# Patient Record
Sex: MALE | Race: WHITE | Employment: PART TIME | URBAN - METROPOLITAN AREA
[De-identification: names, ages, dates, MRNs, and addresses within clinical notes are randomized per-mention and may not be internally consistent; named-entity substitution may affect disease eponyms.]

---

## 2017-03-02 ENCOUNTER — PAT TELEPHONE (OUTPATIENT)
Dept: PREADMISSION TESTING | Age: 72
End: 2017-03-02

## 2017-03-02 RX ORDER — LIDOCAINE HYDROCHLORIDE 10 MG/ML
1 INJECTION, SOLUTION EPIDURAL; INFILTRATION; INTRACAUDAL; PERINEURAL ONCE
Status: CANCELLED | OUTPATIENT
Start: 2017-03-02 | End: 2017-03-02

## 2017-03-02 RX ORDER — SODIUM CHLORIDE, SODIUM LACTATE, POTASSIUM CHLORIDE, CALCIUM CHLORIDE 600; 310; 30; 20 MG/100ML; MG/100ML; MG/100ML; MG/100ML
INJECTION, SOLUTION INTRAVENOUS CONTINUOUS
Status: CANCELLED | OUTPATIENT
Start: 2017-03-02

## 2017-03-07 ENCOUNTER — HOSPITAL ENCOUNTER (OUTPATIENT)
Dept: SURGERY | Age: 72
Discharge: HOME | End: 2017-03-07
Attending: INTERNAL MEDICINE | Admitting: INTERNAL MEDICINE

## 2017-03-07 VITALS
TEMPERATURE: 97.5 F | HEIGHT: 68 IN | OXYGEN SATURATION: 97 % | WEIGHT: 205 LBS | BODY MASS INDEX: 31.07 KG/M2 | SYSTOLIC BLOOD PRESSURE: 126 MMHG | DIASTOLIC BLOOD PRESSURE: 85 MMHG | HEART RATE: 67 BPM | RESPIRATION RATE: 16 BRPM

## 2017-03-07 RX ORDER — OMEPRAZOLE 20 MG/1
20 CAPSULE, DELAYED RELEASE ORAL DAILY
Qty: 30 CAPSULE | Refills: 3 | Status: SHIPPED | OUTPATIENT
Start: 2017-03-07

## 2017-03-07 RX ORDER — MOMETASONE FUROATE 50 UG/1
2 SPRAY, METERED NASAL DAILY
COMMUNITY

## 2017-03-07 RX ORDER — KETOROLAC TROMETHAMINE 30 MG/ML
30 INJECTION, SOLUTION INTRAMUSCULAR; INTRAVENOUS EVERY 6 HOURS PRN
COMMUNITY

## 2017-03-07 RX ORDER — SODIUM CHLORIDE, SODIUM LACTATE, POTASSIUM CHLORIDE, CALCIUM CHLORIDE 600; 310; 30; 20 MG/100ML; MG/100ML; MG/100ML; MG/100ML
INJECTION, SOLUTION INTRAVENOUS CONTINUOUS
Status: DISCONTINUED | OUTPATIENT
Start: 2017-03-07 | End: 2017-03-08 | Stop reason: HOSPADM

## 2017-03-07 RX ORDER — ATORVASTATIN CALCIUM 20 MG/1
20 TABLET, FILM COATED ORAL DAILY
COMMUNITY

## 2017-03-07 RX ORDER — LIDOCAINE HYDROCHLORIDE 10 MG/ML
1 INJECTION, SOLUTION EPIDURAL; INFILTRATION; INTRACAUDAL; PERINEURAL ONCE
Status: DISCONTINUED | OUTPATIENT
Start: 2017-03-07 | End: 2017-03-08 | Stop reason: HOSPADM

## 2017-03-07 RX ORDER — TADALAFIL 20 MG/1
20 TABLET ORAL PRN
COMMUNITY

## 2017-03-07 RX ORDER — LORATADINE 10 MG/1
10 CAPSULE, LIQUID FILLED ORAL DAILY
COMMUNITY

## 2017-03-07 RX ORDER — OMEPRAZOLE 20 MG/1
20 CAPSULE, DELAYED RELEASE ORAL DAILY
COMMUNITY

## 2017-03-07 RX ADMIN — SODIUM CHLORIDE, SODIUM LACTATE, POTASSIUM CHLORIDE, CALCIUM CHLORIDE: 600; 310; 30; 20 INJECTION, SOLUTION INTRAVENOUS at 11:24

## 2017-03-07 ASSESSMENT — PAIN - FUNCTIONAL ASSESSMENT: PAIN_FUNCTIONAL_ASSESSMENT: 0-10

## 2017-03-07 ASSESSMENT — PAIN SCALES - GENERAL
PAINLEVEL_OUTOF10: 0
PAINLEVEL_OUTOF10: 0

## 2025-05-10 ENCOUNTER — HOSPITAL ENCOUNTER (INPATIENT)
Age: 80
LOS: 1 days | Discharge: HOME | DRG: 309 | End: 2025-05-12
Attending: EMERGENCY MEDICINE | Admitting: INTERNAL MEDICINE
Payer: MEDICARE

## 2025-05-10 ENCOUNTER — APPOINTMENT (OUTPATIENT)
Age: 80
DRG: 309 | End: 2025-05-10
Payer: MEDICARE

## 2025-05-10 DIAGNOSIS — I48.91 ATRIAL FIBRILLATION WITH RVR (HCC): Primary | ICD-10-CM

## 2025-05-10 DIAGNOSIS — I48.0 PAROXYSMAL ATRIAL FIBRILLATION (HCC): ICD-10-CM

## 2025-05-10 LAB
ALBUMIN SERPL-MCNC: 4.2 G/DL (ref 3.4–5)
ALBUMIN/GLOB SERPL: 1.5 {RATIO}
ALP SERPL-CCNC: 83 U/L (ref 40–129)
ALT SERPL-CCNC: 17 U/L (ref 10–40)
ANION GAP SERPL CALCULATED.3IONS-SCNC: 12 MMOL/L (ref 3–16)
AST SERPL-CCNC: 22 U/L (ref 15–37)
BASOPHILS # BLD: 0.04 K/UL (ref 0–0.2)
BASOPHILS NFR BLD: 0 %
BILIRUB SERPL-MCNC: 0.9 MG/DL (ref 0–1)
BUN SERPL-MCNC: 11 MG/DL (ref 7–20)
CALCIUM SERPL-MCNC: 9.1 MG/DL (ref 8.3–10.6)
CHLORIDE SERPL-SCNC: 104 MMOL/L (ref 99–110)
CO2 SERPL-SCNC: 25 MMOL/L (ref 21–32)
CREAT SERPL-MCNC: 1.1 MG/DL (ref 0.8–1.3)
EOSINOPHIL # BLD: 0.2 K/UL (ref 0–0.6)
EOSINOPHILS RELATIVE PERCENT: 2 %
ERYTHROCYTE [DISTWIDTH] IN BLOOD BY AUTOMATED COUNT: 15.3 % (ref 12.4–15.4)
GFR, ESTIMATED: 70 ML/MIN/1.73M2
GLUCOSE SERPL-MCNC: 104 MG/DL (ref 70–99)
HCT VFR BLD AUTO: 50.3 % (ref 40.5–52.5)
HGB BLD-MCNC: 16.8 G/DL (ref 13.5–17.5)
IMM GRANULOCYTES # BLD AUTO: 0.03 K/UL (ref 0–0.5)
IMM GRANULOCYTES NFR BLD: 0 %
LYMPHOCYTES NFR BLD: 2.3 K/UL (ref 1–5.1)
LYMPHOCYTES RELATIVE PERCENT: 25 %
MCH RBC QN AUTO: 32 PG (ref 26–34)
MCHC RBC AUTO-ENTMCNC: 33.4 G/DL (ref 31–36)
MCV RBC AUTO: 95.8 FL (ref 80–100)
MONOCYTES NFR BLD: 1.13 K/UL (ref 0–1.3)
MONOCYTES NFR BLD: 12 %
NEUTROPHILS NFR BLD: 60 %
NEUTS SEG NFR BLD: 5.42 K/UL (ref 1.7–7.7)
PLATELET # BLD AUTO: 190 K/UL (ref 135–450)
PMV BLD AUTO: 10 FL
POTASSIUM SERPL-SCNC: 4.8 MMOL/L (ref 3.5–5.1)
PROT SERPL-MCNC: 6.9 G/DL (ref 6.4–8.2)
RBC # BLD AUTO: 5.25 M/UL (ref 4.2–5.9)
SODIUM SERPL-SCNC: 141 MMOL/L (ref 136–145)
TROPONIN I SERPL HS-MCNC: 24 NG/L (ref 0–22)
TROPONIN I SERPL HS-MCNC: 27 NG/L (ref 0–22)
WBC OTHER # BLD: 9.1 K/UL (ref 4–11)

## 2025-05-10 PROCEDURE — 96374 THER/PROPH/DIAG INJ IV PUSH: CPT

## 2025-05-10 PROCEDURE — G0378 HOSPITAL OBSERVATION PER HR: HCPCS

## 2025-05-10 PROCEDURE — 93005 ELECTROCARDIOGRAM TRACING: CPT | Performed by: EMERGENCY MEDICINE

## 2025-05-10 PROCEDURE — 6370000000 HC RX 637 (ALT 250 FOR IP): Performed by: INTERNAL MEDICINE

## 2025-05-10 PROCEDURE — 84484 ASSAY OF TROPONIN QUANT: CPT

## 2025-05-10 PROCEDURE — 96375 TX/PRO/DX INJ NEW DRUG ADDON: CPT

## 2025-05-10 PROCEDURE — 71045 X-RAY EXAM CHEST 1 VIEW: CPT

## 2025-05-10 PROCEDURE — 2500000003 HC RX 250 WO HCPCS: Performed by: INTERNAL MEDICINE

## 2025-05-10 PROCEDURE — 80053 COMPREHEN METABOLIC PANEL: CPT

## 2025-05-10 PROCEDURE — 85025 COMPLETE CBC W/AUTO DIFF WBC: CPT

## 2025-05-10 PROCEDURE — 99285 EMERGENCY DEPT VISIT HI MDM: CPT

## 2025-05-10 PROCEDURE — 2500000003 HC RX 250 WO HCPCS: Performed by: EMERGENCY MEDICINE

## 2025-05-10 RX ORDER — METOPROLOL TARTRATE 25 MG/1
25 TABLET, FILM COATED ORAL 2 TIMES DAILY
Status: DISCONTINUED | OUTPATIENT
Start: 2025-05-10 | End: 2025-05-12 | Stop reason: HOSPADM

## 2025-05-10 RX ORDER — ACETAMINOPHEN 325 MG/1
650 TABLET ORAL EVERY 6 HOURS PRN
Status: DISCONTINUED | OUTPATIENT
Start: 2025-05-10 | End: 2025-05-12 | Stop reason: HOSPADM

## 2025-05-10 RX ORDER — TAMSULOSIN HYDROCHLORIDE 0.4 MG/1
0.4 CAPSULE ORAL DAILY
Status: DISCONTINUED | OUTPATIENT
Start: 2025-05-10 | End: 2025-05-12 | Stop reason: HOSPADM

## 2025-05-10 RX ORDER — DILTIAZEM HYDROCHLORIDE 5 MG/ML
15 INJECTION INTRAVENOUS ONCE
Status: COMPLETED | OUTPATIENT
Start: 2025-05-10 | End: 2025-05-10

## 2025-05-10 RX ORDER — MAGNESIUM SULFATE IN WATER 40 MG/ML
2000 INJECTION, SOLUTION INTRAVENOUS PRN
Status: DISCONTINUED | OUTPATIENT
Start: 2025-05-10 | End: 2025-05-12 | Stop reason: HOSPADM

## 2025-05-10 RX ORDER — SERTRALINE HYDROCHLORIDE 25 MG/1
50 TABLET, FILM COATED ORAL DAILY
Status: DISCONTINUED | OUTPATIENT
Start: 2025-05-11 | End: 2025-05-12 | Stop reason: HOSPADM

## 2025-05-10 RX ORDER — SPIRONOLACTONE 25 MG/1
25 TABLET ORAL DAILY
COMMUNITY

## 2025-05-10 RX ORDER — SODIUM CHLORIDE 0.9 % (FLUSH) 0.9 %
5-40 SYRINGE (ML) INJECTION PRN
Status: DISCONTINUED | OUTPATIENT
Start: 2025-05-10 | End: 2025-05-12 | Stop reason: HOSPADM

## 2025-05-10 RX ORDER — METOPROLOL TARTRATE 25 MG/1
25 TABLET, FILM COATED ORAL 2 TIMES DAILY
Status: DISCONTINUED | OUTPATIENT
Start: 2025-05-10 | End: 2025-05-10

## 2025-05-10 RX ORDER — ACETAMINOPHEN 650 MG/1
650 SUPPOSITORY RECTAL EVERY 6 HOURS PRN
Status: DISCONTINUED | OUTPATIENT
Start: 2025-05-10 | End: 2025-05-12 | Stop reason: HOSPADM

## 2025-05-10 RX ORDER — SACUBITRIL AND VALSARTAN 24; 26 MG/1; MG/1
0.5 TABLET, FILM COATED ORAL 2 TIMES DAILY
COMMUNITY

## 2025-05-10 RX ORDER — POTASSIUM CHLORIDE 7.45 MG/ML
10 INJECTION INTRAVENOUS PRN
Status: DISCONTINUED | OUTPATIENT
Start: 2025-05-10 | End: 2025-05-12 | Stop reason: HOSPADM

## 2025-05-10 RX ORDER — TAMSULOSIN HYDROCHLORIDE 0.4 MG/1
0.4 CAPSULE ORAL DAILY
COMMUNITY

## 2025-05-10 RX ORDER — SODIUM CHLORIDE 9 MG/ML
INJECTION, SOLUTION INTRAVENOUS PRN
Status: DISCONTINUED | OUTPATIENT
Start: 2025-05-10 | End: 2025-05-12 | Stop reason: HOSPADM

## 2025-05-10 RX ORDER — ATORVASTATIN CALCIUM 40 MG/1
40 TABLET, FILM COATED ORAL DAILY
Status: DISCONTINUED | OUTPATIENT
Start: 2025-05-10 | End: 2025-05-12 | Stop reason: HOSPADM

## 2025-05-10 RX ORDER — POLYETHYLENE GLYCOL 3350 17 G/17G
17 POWDER, FOR SOLUTION ORAL DAILY PRN
Status: DISCONTINUED | OUTPATIENT
Start: 2025-05-10 | End: 2025-05-12 | Stop reason: HOSPADM

## 2025-05-10 RX ORDER — ONDANSETRON 4 MG/1
4 TABLET, ORALLY DISINTEGRATING ORAL EVERY 8 HOURS PRN
Status: DISCONTINUED | OUTPATIENT
Start: 2025-05-10 | End: 2025-05-12 | Stop reason: HOSPADM

## 2025-05-10 RX ORDER — CETIRIZINE HYDROCHLORIDE 10 MG/1
5 TABLET ORAL DAILY
Status: DISCONTINUED | OUTPATIENT
Start: 2025-05-10 | End: 2025-05-12 | Stop reason: HOSPADM

## 2025-05-10 RX ORDER — METOPROLOL TARTRATE 25 MG/1
12.5 TABLET, FILM COATED ORAL 2 TIMES DAILY
Status: DISCONTINUED | OUTPATIENT
Start: 2025-05-10 | End: 2025-05-10

## 2025-05-10 RX ORDER — OMEPRAZOLE 40 MG/1
40 CAPSULE, DELAYED RELEASE ORAL DAILY
COMMUNITY
End: 2025-05-10

## 2025-05-10 RX ORDER — SODIUM CHLORIDE 0.9 % (FLUSH) 0.9 %
5-40 SYRINGE (ML) INJECTION EVERY 12 HOURS SCHEDULED
Status: DISCONTINUED | OUTPATIENT
Start: 2025-05-10 | End: 2025-05-12 | Stop reason: HOSPADM

## 2025-05-10 RX ORDER — ONDANSETRON 2 MG/ML
4 INJECTION INTRAMUSCULAR; INTRAVENOUS EVERY 6 HOURS PRN
Status: DISCONTINUED | OUTPATIENT
Start: 2025-05-10 | End: 2025-05-12 | Stop reason: HOSPADM

## 2025-05-10 RX ORDER — SPIRONOLACTONE 25 MG/1
25 TABLET ORAL DAILY
Status: DISCONTINUED | OUTPATIENT
Start: 2025-05-10 | End: 2025-05-12 | Stop reason: HOSPADM

## 2025-05-10 RX ORDER — POTASSIUM CHLORIDE 1500 MG/1
40 TABLET, EXTENDED RELEASE ORAL PRN
Status: DISCONTINUED | OUTPATIENT
Start: 2025-05-10 | End: 2025-05-12 | Stop reason: HOSPADM

## 2025-05-10 RX ADMIN — CETIRIZINE HYDROCHLORIDE 5 MG: 10 TABLET, FILM COATED ORAL at 18:48

## 2025-05-10 RX ADMIN — SACUBITRIL AND VALSARTAN 1 TABLET: 24; 26 TABLET, FILM COATED ORAL at 21:21

## 2025-05-10 RX ADMIN — DILTIAZEM HYDROCHLORIDE 15 MG: 5 INJECTION, SOLUTION INTRAVENOUS at 15:06

## 2025-05-10 RX ADMIN — SPIRONOLACTONE 25 MG: 25 TABLET ORAL at 18:48

## 2025-05-10 RX ADMIN — Medication 10 ML: at 21:24

## 2025-05-10 RX ADMIN — METOPROLOL TARTRATE 25 MG: 25 TABLET, FILM COATED ORAL at 18:27

## 2025-05-10 RX ADMIN — ATORVASTATIN CALCIUM 40 MG: 40 TABLET, FILM COATED ORAL at 18:48

## 2025-05-10 RX ADMIN — APIXABAN 5 MG: 5 TABLET, FILM COATED ORAL at 21:22

## 2025-05-10 RX ADMIN — TAMSULOSIN HYDROCHLORIDE 0.4 MG: 0.4 CAPSULE ORAL at 18:48

## 2025-05-10 ASSESSMENT — LIFESTYLE VARIABLES
HOW MANY STANDARD DRINKS CONTAINING ALCOHOL DO YOU HAVE ON A TYPICAL DAY: PATIENT DOES NOT DRINK
HOW OFTEN DO YOU HAVE A DRINK CONTAINING ALCOHOL: NEVER

## 2025-05-10 ASSESSMENT — PAIN SCALES - GENERAL
PAINLEVEL_OUTOF10: 0

## 2025-05-10 NOTE — ED PROVIDER NOTES
[facility] EMERGENCY DEPARTMENT    CHIEF COMPLAINT  Other (Patient states his wife made him come to the ED for \"high heart rate\". Patient denies CP or heart palpitations but states he does have a history of afib. )       HISTORY OF PRESENT ILLNESS  Chance Zambrano is a 80 y.o. male with past medical history of atrial fibrillation on Eliquis, CHF, PVCs who presents to the ED with elevated heart rate.  They checked his vitals this morning and his heart rate was between 135 and 140.  They called a family member who is a nurse and they told him to come to the emergency department.  They went to [facility] and they checked his heart rate and it was still 145 so they told him to come to the emergency department.    He denies chest pain, shortness of breath, palpitations, syncope, leg swelling, fever, vomiting, diarrhea, blood in stool.     I have reviewed the following from the nursing documentation:    Past Medical History:   Diagnosis Date    DVT (deep venous thrombosis) (HCC)     Hypertension      Past Surgical History:   Procedure Laterality Date    BLADDER REMOVAL  2002    BORN WITH 2 BLADDER ,NON FUNCTIONING ONE REMOVED    BLADDER SURGERY      COLONOSCOPY  2007    NORMAL    COLONOSCOPY  10/31/12    divert.    HERNIA REPAIR      HERNIA REPAIR  2002    ANDRIY INQ.    JOINT REPLACEMENT  2010    RIGHT    KNEE SURGERY Bilateral     KNEE SURGERY  1978,2000    LEFT AND RIGHT    TONSILLECTOMY      UPPER GASTROINTESTINAL ENDOSCOPY  03/07/2017    Esophageal dilatation     Family History   Problem Relation Age of Onset    Cancer Mother         COLON ,MOUTH     Social History     Socioeconomic History    Marital status:      Spouse name: Not on file    Number of children: Not on file    Years of education: Not on file    Highest education level: Not on file   Occupational History    Not on file   Tobacco Use    Smoking status: Never    Smokeless tobacco: Not on file   Substance and Sexual Activity    Alcohol

## 2025-05-10 NOTE — ED NOTES
ED to Inpatient Handoff SBAR    Patient Name: Chance Zambrano   :  1945  80 y.o.   MRN:  [id number]  Preferred Name    ED Room #:    Family/Caregiver Present yes     Chief Complaint Other (Patient states his wife made him come to the ED for \"high heart rate\". Patient denies CP or heart palpitations but states he does have a history of afib. )       Restraints no   Sitter no   Sepsis Risk Score    Isolation No active isolations   Fall Risk Assessment Presents to emergency department  because of falls (Syncope, seizure, or loss of consciousness): No, Age > 70: No, Altered Mental Status, Intoxication with alcohol or substance confusion (Disorientation, impaired judgment, poor safety awaremess, or inability to follow instructions): No, Impaired Mobility: Ambulates or transfers with assistive devices or assistance; Unable to ambulate or transer.: No, Nursing Judgement: No     Situation  Code Status: No Order No additional code details.    Allergies: Patient has no known allergies.  Weight: Patient Vitals for the past 96 hrs (Last 3 readings):   Weight   05/10/25 1441 92.7 kg (204 lb 6.4 oz)     Arrived from: home  Hospital Problem/Diagnosis:  Principal Problem:    A-fib (HCC)  Resolved Problems:    * No resolved hospital problems. *    Imaging:   XR CHEST PORTABLE   Final Result   Impression:      1. Slight consolidation lung base which could represent atelectasis or mild airspace disease.      Electronically signed by Solitario Simmons MD        Abnormal labs:   Abnormal Labs Reviewed   COMPREHENSIVE METABOLIC PANEL W/ REFLEX TO MG FOR LOW K - Abnormal; Notable for the following components:       Result Value    Glucose 104 (*)     All other components within normal limits   TROPONIN - Abnormal; Notable for the following components:    Troponin, High Sensitivity 27 (*)     All other components within normal limits   TROPONIN - Abnormal; Notable for the following components:    Troponin, High Sensitivity 24 (*)

## 2025-05-10 NOTE — H&P
History and Physical      Name:  Chance Zambrano /Age/Sex: 1945  (80 y.o. male)   MRN & CSN:  [id number] & [id number] Admission Date/Time: 5/10/2025  2:36 PM   Location:  3206/3206-01 PCP: Krishna Flores MD       Hospital Day: 1    Impression   Chance Zambrano is a 80 y.o.  male  who presents with known history of A-fib status post ablation presented with tachycardia.  His heart rate was in the 140s at home.  In the ED, patient was in A-fib with RVR and subsequently received Cardizem bolus 15 mg and subsequently heart rate became controlled.  He was admitted for further evaluation and management of A-fib.      Assessment   # A-fib with RVR, rate controlled after Cardizem bolus given in the ED. Patient is status post ablation 3 years ago.  He was taken off Lopressor 2 months ago.  # Fatigue.   # Mild elevated troponin, likely supply/demand mismatch in the setting of tachycardia.  # Long-term anticoagulation and Eliquis.  # Hyperlipidemia on Lipitor.  # Depression on Zoloft.  # BPH on Flomax  # CHF, HFpEF, chronic, stable, echo 2023 showed EF of 60 to 65%.    Plan:   # Noted heart rate going up in the 120s, resume Lopressor at 25 mg twice daily.  Obtain twelve-lead EKG.  # Monitor on telemetry.  # If patient continues to be in A-fib, will consult cardiology in AM.  If patient reverts to sinus rhythm, likely discharge on Lopressor with outpatient cardiology follow-up.  Patient follows with cardiology at [facility], Dr. Mitchell.  # Continue Eliquis.    DVT Prophylaxis: On Eliquis  Code Status: Full Code -discussed with patient.        History of Present Illness:     Chief Complaint: Tachycardia  Chance Zambrano is a 80 y.o.  male with known history of A-fib status post ablation 3 years ago presented with tachycardia.  Patient checked his heart rate at home using a blood pressure cuff and noted to heart rate in the 140s.  In the ED, patient was found to be in A-fib with RVR.  He received Cardizem IV bolus 50 mg and

## 2025-05-10 NOTE — PLAN OF CARE
Problem: Discharge Planning  Goal: Discharge to home or other facility with appropriate resources  Outcome: Progressing  Flowsheets (Taken 5/10/2025 1738)  Discharge to home or other facility with appropriate resources: Identify barriers to discharge with patient and caregiver     Problem: Pain  Goal: Verbalizes/displays adequate comfort level or baseline comfort level  Outcome: Progressing  Flowsheets (Taken 5/10/2025 1738)  Verbalizes/displays adequate comfort level or baseline comfort level:   Encourage patient to monitor pain and request assistance   Administer analgesics based on type and severity of pain and evaluate response   Assess pain using appropriate pain scale   Implement non-pharmacological measures as appropriate and evaluate response     Problem: Safety - Adult  Goal: Free from fall injury  Outcome: Progressing  Flowsheets (Taken 5/10/2025 1738)  Free From Fall Injury: Instruct family/caregiver on patient safety

## 2025-05-11 PROBLEM — I25.10 CORONARY ARTERY DISEASE DUE TO LIPID RICH PLAQUE: Status: ACTIVE | Noted: 2025-05-11

## 2025-05-11 PROBLEM — I48.91 ATRIAL FIBRILLATION WITH RVR (HCC): Status: ACTIVE | Noted: 2025-05-11

## 2025-05-11 PROBLEM — I48.91 AFIB (HCC): Status: ACTIVE | Noted: 2025-05-11

## 2025-05-11 PROBLEM — I25.83 CORONARY ARTERY DISEASE DUE TO LIPID RICH PLAQUE: Status: ACTIVE | Noted: 2025-05-11

## 2025-05-11 PROBLEM — R79.89 ELEVATED TROPONIN: Status: ACTIVE | Noted: 2025-05-11

## 2025-05-11 PROBLEM — I95.2 HYPOTENSION DUE TO DRUGS: Status: ACTIVE | Noted: 2025-05-11

## 2025-05-11 PROBLEM — I71.9 AORTIC ANEURYSM: Status: ACTIVE | Noted: 2025-05-11

## 2025-05-11 LAB
ANION GAP SERPL CALCULATED.3IONS-SCNC: 11 MMOL/L (ref 3–16)
BASOPHILS # BLD: 0.06 K/UL (ref 0–0.2)
BASOPHILS NFR BLD: 1 %
BUN SERPL-MCNC: 12 MG/DL (ref 7–20)
CALCIUM SERPL-MCNC: 8.8 MG/DL (ref 8.3–10.6)
CHLORIDE SERPL-SCNC: 106 MMOL/L (ref 99–110)
CO2 SERPL-SCNC: 22 MMOL/L (ref 21–32)
CREAT SERPL-MCNC: 1 MG/DL (ref 0.8–1.3)
EOSINOPHIL # BLD: 0.33 K/UL (ref 0–0.6)
EOSINOPHILS RELATIVE PERCENT: 4 %
ERYTHROCYTE [DISTWIDTH] IN BLOOD BY AUTOMATED COUNT: 15.1 % (ref 12.4–15.4)
GFR, ESTIMATED: 77 ML/MIN/1.73M2
GLUCOSE SERPL-MCNC: 118 MG/DL (ref 70–99)
HCT VFR BLD AUTO: 45.5 % (ref 40.5–52.5)
HGB BLD-MCNC: 15.2 G/DL (ref 13.5–17.5)
IMM GRANULOCYTES # BLD AUTO: 0.08 K/UL (ref 0–0.5)
IMM GRANULOCYTES NFR BLD: 1 %
LYMPHOCYTES NFR BLD: 2.64 K/UL (ref 1–5.1)
LYMPHOCYTES RELATIVE PERCENT: 29 %
MAGNESIUM SERPL-MCNC: 1.8 MG/DL (ref 1.8–2.4)
MCH RBC QN AUTO: 31.9 PG (ref 26–34)
MCHC RBC AUTO-ENTMCNC: 33.4 G/DL (ref 31–36)
MCV RBC AUTO: 95.4 FL (ref 80–100)
MONOCYTES NFR BLD: 0.99 K/UL (ref 0–1.3)
MONOCYTES NFR BLD: 11 %
NEUTROPHILS NFR BLD: 55 %
NEUTS SEG NFR BLD: 4.96 K/UL (ref 1.7–7.7)
PLATELET # BLD AUTO: 162 K/UL (ref 135–450)
PMV BLD AUTO: 9.7 FL
POTASSIUM SERPL-SCNC: 4.4 MMOL/L (ref 3.5–5.1)
RBC # BLD AUTO: 4.77 M/UL (ref 4.2–5.9)
SODIUM SERPL-SCNC: 139 MMOL/L (ref 136–145)
TROPONIN I SERPL HS-MCNC: 30 NG/L (ref 0–22)
WBC OTHER # BLD: 9.1 K/UL (ref 4–11)

## 2025-05-11 PROCEDURE — 2000000000 HC ICU R&B

## 2025-05-11 PROCEDURE — 84484 ASSAY OF TROPONIN QUANT: CPT

## 2025-05-11 PROCEDURE — 99291 CRITICAL CARE FIRST HOUR: CPT | Performed by: INTERNAL MEDICINE

## 2025-05-11 PROCEDURE — 6360000002 HC RX W HCPCS: Performed by: STUDENT IN AN ORGANIZED HEALTH CARE EDUCATION/TRAINING PROGRAM

## 2025-05-11 PROCEDURE — P9047 ALBUMIN (HUMAN), 25%, 50ML: HCPCS | Performed by: STUDENT IN AN ORGANIZED HEALTH CARE EDUCATION/TRAINING PROGRAM

## 2025-05-11 PROCEDURE — 6370000000 HC RX 637 (ALT 250 FOR IP): Performed by: INTERNAL MEDICINE

## 2025-05-11 PROCEDURE — 96361 HYDRATE IV INFUSION ADD-ON: CPT

## 2025-05-11 PROCEDURE — 96365 THER/PROPH/DIAG IV INF INIT: CPT

## 2025-05-11 PROCEDURE — 83735 ASSAY OF MAGNESIUM: CPT

## 2025-05-11 PROCEDURE — 85025 COMPLETE CBC W/AUTO DIFF WBC: CPT

## 2025-05-11 PROCEDURE — 93005 ELECTROCARDIOGRAM TRACING: CPT | Performed by: INTERNAL MEDICINE

## 2025-05-11 PROCEDURE — 80048 BASIC METABOLIC PNL TOTAL CA: CPT

## 2025-05-11 PROCEDURE — 2500000003 HC RX 250 WO HCPCS: Performed by: INTERNAL MEDICINE

## 2025-05-11 PROCEDURE — 36415 COLL VENOUS BLD VENIPUNCTURE: CPT

## 2025-05-11 PROCEDURE — 2500000003 HC RX 250 WO HCPCS

## 2025-05-11 PROCEDURE — 2500000003 HC RX 250 WO HCPCS: Performed by: NURSE PRACTITIONER

## 2025-05-11 PROCEDURE — 2580000003 HC RX 258: Performed by: NURSE PRACTITIONER

## 2025-05-11 PROCEDURE — 99223 1ST HOSP IP/OBS HIGH 75: CPT | Performed by: NURSE PRACTITIONER

## 2025-05-11 PROCEDURE — 2580000003 HC RX 258: Performed by: STUDENT IN AN ORGANIZED HEALTH CARE EDUCATION/TRAINING PROGRAM

## 2025-05-11 RX ORDER — MIDODRINE HYDROCHLORIDE 5 MG/1
5 TABLET ORAL
Status: DISCONTINUED | OUTPATIENT
Start: 2025-05-11 | End: 2025-05-11

## 2025-05-11 RX ORDER — DILTIAZEM HCL IN NACL,ISO-OSM 125 MG/125
2.5-15 PLASTIC BAG, INJECTION (ML) INTRAVENOUS CONTINUOUS
Status: DISCONTINUED | OUTPATIENT
Start: 2025-05-11 | End: 2025-05-12

## 2025-05-11 RX ORDER — ALBUMIN (HUMAN) 12.5 G/50ML
25 SOLUTION INTRAVENOUS ONCE
Status: COMPLETED | OUTPATIENT
Start: 2025-05-11 | End: 2025-05-11

## 2025-05-11 RX ORDER — PANTOPRAZOLE SODIUM 40 MG/1
40 TABLET, DELAYED RELEASE ORAL
Status: DISCONTINUED | OUTPATIENT
Start: 2025-05-13 | End: 2025-05-12 | Stop reason: HOSPADM

## 2025-05-11 RX ORDER — NOREPINEPHRINE BITARTRATE 0.06 MG/ML
INJECTION, SOLUTION INTRAVENOUS
Status: COMPLETED
Start: 2025-05-11 | End: 2025-05-11

## 2025-05-11 RX ORDER — 0.9 % SODIUM CHLORIDE 0.9 %
250 INTRAVENOUS SOLUTION INTRAVENOUS ONCE
Status: COMPLETED | OUTPATIENT
Start: 2025-05-11 | End: 2025-05-11

## 2025-05-11 RX ORDER — 0.9 % SODIUM CHLORIDE 0.9 %
500 INTRAVENOUS SOLUTION INTRAVENOUS ONCE
Status: COMPLETED | OUTPATIENT
Start: 2025-05-11 | End: 2025-05-11

## 2025-05-11 RX ORDER — NOREPINEPHRINE BITARTRATE 0.06 MG/ML
1-100 INJECTION, SOLUTION INTRAVENOUS CONTINUOUS
Status: DISCONTINUED | OUTPATIENT
Start: 2025-05-11 | End: 2025-05-12 | Stop reason: HOSPADM

## 2025-05-11 RX ADMIN — Medication 10 ML: at 10:02

## 2025-05-11 RX ADMIN — ATORVASTATIN CALCIUM 40 MG: 40 TABLET, FILM COATED ORAL at 09:43

## 2025-05-11 RX ADMIN — SODIUM CHLORIDE 500 ML: 0.9 INJECTION, SOLUTION INTRAVENOUS at 11:10

## 2025-05-11 RX ADMIN — SERTRALINE HYDROCHLORIDE 50 MG: 25 TABLET ORAL at 09:43

## 2025-05-11 RX ADMIN — AMIODARONE HYDROCHLORIDE 0.5 MG/MIN: 1.8 INJECTION, SOLUTION INTRAVENOUS at 15:37

## 2025-05-11 RX ADMIN — NOREPINEPHRINE BITARTRATE 2 MCG/MIN: 64 SOLUTION INTRAVENOUS at 05:51

## 2025-05-11 RX ADMIN — ALBUMIN (HUMAN) 25 G: 0.25 INJECTION, SOLUTION INTRAVENOUS at 01:28

## 2025-05-11 RX ADMIN — EMPAGLIFLOZIN 10 MG: 10 TABLET, FILM COATED ORAL at 09:43

## 2025-05-11 RX ADMIN — Medication 10 ML: at 21:00

## 2025-05-11 RX ADMIN — NOREPINEPHRINE BITARTRATE 2 MCG/MIN: 0.06 INJECTION, SOLUTION INTRAVENOUS at 05:51

## 2025-05-11 RX ADMIN — APIXABAN 5 MG: 5 TABLET, FILM COATED ORAL at 09:42

## 2025-05-11 RX ADMIN — SODIUM CHLORIDE 250 ML: 0.9 INJECTION, SOLUTION INTRAVENOUS at 03:04

## 2025-05-11 RX ADMIN — AMIODARONE HYDROCHLORIDE 150 MG: 1.5 INJECTION, SOLUTION INTRAVENOUS at 09:50

## 2025-05-11 RX ADMIN — APIXABAN 5 MG: 5 TABLET, FILM COATED ORAL at 21:00

## 2025-05-11 RX ADMIN — AMIODARONE HYDROCHLORIDE 1 MG/MIN: 1.8 INJECTION, SOLUTION INTRAVENOUS at 10:01

## 2025-05-11 RX ADMIN — CETIRIZINE HYDROCHLORIDE 5 MG: 10 TABLET, FILM COATED ORAL at 09:43

## 2025-05-11 ASSESSMENT — PAIN SCALES - GENERAL
PAINLEVEL_OUTOF10: 0

## 2025-05-11 NOTE — PLAN OF CARE
Problem: Discharge Planning  Goal: Discharge to home or other facility with appropriate resources  Outcome: Progressing     Problem: Pain  Goal: Verbalizes/displays adequate comfort level or baseline comfort level  Outcome: Progressing     Problem: Safety - Adult  Goal: Free from fall injury  Outcome: Progressing  Flowsheets (Taken 5/11/2025 0615 by Gogo Jc RN)  Free From Fall Injury: Instruct family/caregiver on patient safety     Problem: ABCDS Injury Assessment  Goal: Absence of physical injury  Outcome: Progressing     Problem: Chronic Conditions and Co-morbidities  Goal: Patient's chronic conditions and co-morbidity symptoms are monitored and maintained or improved  Outcome: Progressing

## 2025-05-11 NOTE — PLAN OF CARE
Problem: Discharge Planning  Goal: Discharge to home or other facility with appropriate resources  5/10/2025 2306 by Gita Peralta RN  Outcome: Progressing     Problem: Pain  Goal: Verbalizes/displays adequate comfort level or baseline comfort level  5/10/2025 2306 by Gita Peralta RN  Outcome: Progressing  Flowsheets (Taken 5/10/2025 1945)  Verbalizes/displays adequate comfort level or baseline comfort level:   Encourage patient to monitor pain and request assistance   Assess pain using appropriate pain scale   Administer analgesics based on type and severity of pain and evaluate response   Implement non-pharmacological measures as appropriate and evaluate response   Consider cultural and social influences on pain and pain management   Notify Licensed Independent Practitioner if interventions unsuccessful or patient reports new pain     Problem: Safety - Adult  Goal: Free from fall injury  5/10/2025 2306 by Gita Peralta RN  Outcome: Progressing  Flowsheets (Taken 5/10/2025 1945)  Free From Fall Injury: Instruct family/caregiver on patient safety

## 2025-05-11 NOTE — CONSULTS
Pulmonary & Critical Care Medicine ICU Consultation    CHIEF COMPLAINT / HPI:                The patient is a 80 y.o. male with significant past medical history of Afib on Eliquis, DVT, HFpEF (EF 60-65%, G1DD in 2023) and hypertension presented with complaints of elevated heart rate. His wife was checking his blood pressure and pulse ox where his heart rat was in the 130's and 140's. Apparently had been taken off of his metoprolol 2 months ago. Also notes that he was stung by a yellow jacket earlier in the day which he thinks may have contributed to his elevated heart rate.     He was given Cardizem in the ER and admitted. He was transferred to the ICU overnight due to transient hypotension which was refractory to a bolus of albumin but did appear to resolve into the morning. For this we have been consulted.    Per the MAR, he did receive 25 mg metoprolol, Entresto, aldactone and Flomax around 7pm last night prior to his hypotension which likely contributed to the refractory hypotension; Chance is unsure if he had taken his aldactone, Flomax, and Entresto earlier in the day.     At my time of evaluation, he is sitting up in bed telling me stories about his life in no overt discomfort.    Past Medical History:      Diagnosis Date    Aortic aneurysm 09/01/2022    Atrial fibrillation (HCC)     CAD (coronary artery disease) 05/05/2022    Cardiac catheterization [facility]    CHF (congestive heart failure) (HCC)     DVT (deep venous thrombosis) ([facility])     Hypertension         Past Surgical History:        Procedure Laterality Date    BLADDER REMOVAL  2002    BORN WITH 2 BLADDER ,NON FUNCTIONING ONE REMOVED    BLADDER SURGERY      CARDIAC ELECTROPHYSIOLOGY STUDY AND ABLATION      A-fib    COLONOSCOPY  2007    NORMAL    COLONOSCOPY  10/31/2012    divert.    HERNIA REPAIR      HERNIA REPAIR  2002    ANDRIY INQ.    JOINT REPLACEMENT  2010    RIGHT    KNEE SURGERY Bilateral     KNEE SURGERY  1978,2000    LEFT AND RIGHT

## 2025-05-11 NOTE — CONSULTS
CARDIOLOGY CONSULTATION        Patient Name: Chance Zambrano  Date of admission: 5/10/2025  2:36 PM  Admission Dx: A-fib (formerly [facility]) [I48.91]  Atrial fibrillation with RVR (formerly [facility]) [I48.91]  Afib (formerly [facility]) [I48.91]  Requesting Physician: Edgar Balderas MD  Primary Care physician: Krishna Flores MD    Reason for Consultation/Chief Complaint: Atrial fibrillation with rapid ventricular response    History of Present Illness:     Chance Zambrano is a 80 y.o. patient with Pmh of atrial fibrillation s/p ablation, CHF, HFpEF, dyslipidemia, depression. Patient presented to the hospital with complaints of tachycardia.  He normally checks his blood pressure and heart rate and has been steady.  Last assessment he found that he was tachycardic.  He denies chest discomfort.  He does state that he had received a severe bee/hornet sting earlier that day.  He said he had immense pain and also hand swelling from the sting.  He was wondering whether that may have contributed.  He follows with Dr. Mitchell of [facility] cardiology.  He did have some transient hypotension and subsequently transferred to the ICU.    Cardiology has been consulted for further evaluation of atrial fibrillation.      Today, he denies chest discomfort or shortness of breath.  Does not describe palpitations.  He has received an amiodarone bolus with infusion to start.      Past Medical History:   has a past medical history of Aortic aneurysm, Atrial fibrillation (formerly [facility]), CAD (coronary artery disease), CHF (congestive heart failure) (formerly [facility]), DVT (deep venous thrombosis) (formerly [facility]), and Hypertension.    Surgical History:   has a past surgical history that includes knee surgery (Bilateral); hernia repair; Bladder surgery; Colonoscopy (2007); joint replacement (2010); knee surgery (1978,2000); Tonsillectomy; hernia repair (2002); Bladder removal (2002); Colonoscopy (10/31/2012); Upper gastrointestinal endoscopy (03/07/2017); and Cardiac electrophysiology study and ablation.     Social

## 2025-05-12 ENCOUNTER — APPOINTMENT (OUTPATIENT)
Age: 80
DRG: 309 | End: 2025-05-12
Attending: INTERNAL MEDICINE
Payer: MEDICARE

## 2025-05-12 VITALS
HEIGHT: 68 IN | TEMPERATURE: 98 F | OXYGEN SATURATION: 96 % | RESPIRATION RATE: 20 BRPM | HEART RATE: 60 BPM | DIASTOLIC BLOOD PRESSURE: 80 MMHG | SYSTOLIC BLOOD PRESSURE: 107 MMHG | BODY MASS INDEX: 29.86 KG/M2 | WEIGHT: 197 LBS

## 2025-05-12 LAB
ALBUMIN SERPL-MCNC: 3.9 G/DL (ref 3.4–5)
ALBUMIN/GLOB SERPL: 1.6 {RATIO}
ALP SERPL-CCNC: 75 U/L (ref 40–129)
ALT SERPL-CCNC: 15 U/L (ref 10–40)
ANION GAP SERPL CALCULATED.3IONS-SCNC: 10 MMOL/L (ref 3–16)
AST SERPL-CCNC: 19 U/L (ref 15–37)
BASOPHILS # BLD: 0.06 K/UL (ref 0–0.2)
BASOPHILS NFR BLD: 1 %
BILIRUB SERPL-MCNC: 0.8 MG/DL (ref 0–1)
BUN SERPL-MCNC: 14 MG/DL (ref 7–20)
CALCIUM SERPL-MCNC: 8.8 MG/DL (ref 8.3–10.6)
CHLORIDE SERPL-SCNC: 105 MMOL/L (ref 99–110)
CO2 SERPL-SCNC: 23 MMOL/L (ref 21–32)
CREAT SERPL-MCNC: 0.9 MG/DL (ref 0.8–1.3)
ECHO AO ASC DIAM: 3.7 CM
ECHO AO ASCENDING AORTA INDEX: 1.82 CM/M2
ECHO AO ROOT DIAM: 4.4 CM
ECHO AO ROOT INDEX: 2.17 CM/M2
ECHO AR MAX VEL PISA: 4.3 M/S
ECHO AV AREA PEAK VELOCITY: 4.8 CM2
ECHO AV AREA VTI: 5 CM2
ECHO AV AREA/BSA PEAK VELOCITY: 2.4 CM2/M2
ECHO AV AREA/BSA VTI: 2.5 CM2/M2
ECHO AV CUSP MM: 1.5 CM
ECHO AV MEAN GRADIENT: 5 MMHG
ECHO AV MEAN VELOCITY: 0.9 M/S
ECHO AV PEAK GRADIENT: 8 MMHG
ECHO AV PEAK VELOCITY: 1.5 M/S
ECHO AV REGURGITANT PHT: 705 MS
ECHO AV VELOCITY RATIO: 0.6
ECHO AV VTI: 33.1 CM
ECHO BSA: 2.07 M2
ECHO EST RA PRESSURE: 15 MMHG
ECHO LA AREA 2C: 28.3 CM2
ECHO LA AREA 4C: 31.5 CM2
ECHO LA DIAMETER INDEX: 2.32 CM/M2
ECHO LA DIAMETER: 4.7 CM
ECHO LA MAJOR AXIS: 7.3 CM
ECHO LA MINOR AXIS: 7.2 CM
ECHO LA TO AORTIC ROOT RATIO: 1.07
ECHO LA VOL BP: 101 ML (ref 18–58)
ECHO LA VOL MOD A2C: 92 ML (ref 18–58)
ECHO LA VOL MOD A4C: 110 ML (ref 18–58)
ECHO LA VOL/BSA BIPLANE: 50 ML/M2 (ref 16–34)
ECHO LA VOLUME INDEX MOD A2C: 45 ML/M2 (ref 16–34)
ECHO LA VOLUME INDEX MOD A4C: 54 ML/M2 (ref 16–34)
ECHO LV E' LATERAL VELOCITY: 7.9 CM/S
ECHO LV E' SEPTAL VELOCITY: 3.5 CM/S
ECHO LV EDV A2C: 118 ML
ECHO LV EDV A4C: 162 ML
ECHO LV EDV INDEX A4C: 80 ML/M2
ECHO LV EDV NDEX A2C: 58 ML/M2
ECHO LV EF PHYSICIAN: 50 %
ECHO LV EJECTION FRACTION A2C: 51 %
ECHO LV EJECTION FRACTION A4C: 50 %
ECHO LV EJECTION FRACTION BIPLANE: 48 % (ref 55–100)
ECHO LV ESV A2C: 58 ML
ECHO LV ESV A4C: 81 ML
ECHO LV ESV INDEX A2C: 29 ML/M2
ECHO LV ESV INDEX A4C: 40 ML/M2
ECHO LV FRACTIONAL SHORTENING: 23 % (ref 28–44)
ECHO LV INTERNAL DIMENSION DIASTOLE INDEX: 2.56 CM/M2
ECHO LV INTERNAL DIMENSION DIASTOLIC: 5.2 CM (ref 4.2–5.9)
ECHO LV INTERNAL DIMENSION SYSTOLIC INDEX: 1.97 CM/M2
ECHO LV INTERNAL DIMENSION SYSTOLIC: 4 CM
ECHO LV IVSD: 1.3 CM (ref 0.6–1)
ECHO LV MASS 2D: 248.8 G (ref 88–224)
ECHO LV MASS INDEX 2D: 122.6 G/M2 (ref 49–115)
ECHO LV POSTERIOR WALL DIASTOLIC: 1.1 CM (ref 0.6–1)
ECHO LV RELATIVE WALL THICKNESS RATIO: 0.42
ECHO LVOT AREA: 8 CM2
ECHO LVOT AV VTI INDEX: 0.62
ECHO LVOT DIAM: 3.2 CM
ECHO LVOT MEAN GRADIENT: 2 MMHG
ECHO LVOT PEAK GRADIENT: 3 MMHG
ECHO LVOT PEAK VELOCITY: 0.9 M/S
ECHO LVOT STROKE VOLUME INDEX: 81.6 ML/M2
ECHO LVOT SV: 165.6 ML
ECHO LVOT VTI: 20.6 CM
ECHO MV A VELOCITY: 0.72 M/S
ECHO MV AREA VTI: 4 CM2
ECHO MV E DECELERATION TIME (DT): 322 MS
ECHO MV E VELOCITY: 0.82 M/S
ECHO MV E/A RATIO: 1.14
ECHO MV E/E' LATERAL: 10.38
ECHO MV E/E' RATIO (AVERAGED): 16.9
ECHO MV E/E' SEPTAL: 23.43
ECHO MV LVOT VTI INDEX: 2
ECHO MV MAX VELOCITY: 1.2 M/S
ECHO MV MEAN GRADIENT: 2 MMHG
ECHO MV MEAN VELOCITY: 0.7 M/S
ECHO MV PEAK GRADIENT: 5 MMHG
ECHO MV REGURGITANT PEAK GRADIENT: 92 MMHG
ECHO MV REGURGITANT PEAK VELOCITY: 4.8 M/S
ECHO MV VTI: 41.1 CM
ECHO PV MAX VELOCITY: 1.1 M/S
ECHO PV PEAK GRADIENT: 5 MMHG
ECHO PVEIN A DURATION: 137 MS
ECHO PVEIN A VELOCITY: 0.1 M/S
ECHO PVEIN PEAK D VELOCITY: 0.2 M/S
ECHO PVEIN PEAK S VELOCITY: 0.3 M/S
ECHO PVEIN S/D RATIO: 1.5 NO UNITS
ECHO RA AREA 4C: 25.8 CM2
ECHO RA END SYSTOLIC VOLUME APICAL 4 CHAMBER INDEX BSA: 38 ML/M2
ECHO RA VOLUME: 78 ML
ECHO RIGHT VENTRICULAR SYSTOLIC PRESSURE (RVSP): 33 MMHG
ECHO RV FREE WALL PEAK S': 11.3 CM/S
ECHO RV INTERNAL DIMENSION: 3.9 CM
ECHO TV REGURGITANT MAX VELOCITY: 2.11 M/S
ECHO TV REGURGITANT PEAK GRADIENT: 18 MMHG
EOSINOPHIL # BLD: 0.32 K/UL (ref 0–0.6)
EOSINOPHILS RELATIVE PERCENT: 3 %
ERYTHROCYTE [DISTWIDTH] IN BLOOD BY AUTOMATED COUNT: 15.4 % (ref 12.4–15.4)
GFR, ESTIMATED: 81 ML/MIN/1.73M2
GLUCOSE SERPL-MCNC: 112 MG/DL (ref 70–99)
HCT VFR BLD AUTO: 43.6 % (ref 40.5–52.5)
HGB BLD-MCNC: 14.9 G/DL (ref 13.5–17.5)
IMM GRANULOCYTES # BLD AUTO: 0.06 K/UL (ref 0–0.5)
IMM GRANULOCYTES NFR BLD: 1 %
LYMPHOCYTES NFR BLD: 2.49 K/UL (ref 1–5.1)
LYMPHOCYTES RELATIVE PERCENT: 19 %
MCH RBC QN AUTO: 32.5 PG (ref 26–34)
MCHC RBC AUTO-ENTMCNC: 34.2 G/DL (ref 31–36)
MCV RBC AUTO: 95.2 FL (ref 80–100)
MONOCYTES NFR BLD: 1.4 K/UL (ref 0–1.3)
MONOCYTES NFR BLD: 11 %
NEUTROPHILS NFR BLD: 66 %
NEUTS SEG NFR BLD: 8.54 K/UL (ref 1.7–7.7)
PLATELET # BLD AUTO: 159 K/UL (ref 135–450)
PMV BLD AUTO: 9.7 FL
POTASSIUM SERPL-SCNC: 4.3 MMOL/L (ref 3.5–5.1)
PROT SERPL-MCNC: 6.3 G/DL (ref 6.4–8.2)
RBC # BLD AUTO: 4.58 M/UL (ref 4.2–5.9)
SODIUM SERPL-SCNC: 138 MMOL/L (ref 136–145)
WBC OTHER # BLD: 12.9 K/UL (ref 4–11)

## 2025-05-12 PROCEDURE — 6360000002 HC RX W HCPCS: Performed by: NURSE PRACTITIONER

## 2025-05-12 PROCEDURE — 93306 TTE W/DOPPLER COMPLETE: CPT

## 2025-05-12 PROCEDURE — 99233 SBSQ HOSP IP/OBS HIGH 50: CPT | Performed by: INTERNAL MEDICINE

## 2025-05-12 PROCEDURE — 6370000000 HC RX 637 (ALT 250 FOR IP): Performed by: INTERNAL MEDICINE

## 2025-05-12 PROCEDURE — 80053 COMPREHEN METABOLIC PANEL: CPT

## 2025-05-12 PROCEDURE — 2500000003 HC RX 250 WO HCPCS: Performed by: INTERNAL MEDICINE

## 2025-05-12 PROCEDURE — 2580000003 HC RX 258: Performed by: NURSE PRACTITIONER

## 2025-05-12 PROCEDURE — 36415 COLL VENOUS BLD VENIPUNCTURE: CPT

## 2025-05-12 PROCEDURE — 85025 COMPLETE CBC W/AUTO DIFF WBC: CPT

## 2025-05-12 PROCEDURE — 93005 ELECTROCARDIOGRAM TRACING: CPT | Performed by: INTERNAL MEDICINE

## 2025-05-12 RX ADMIN — CETIRIZINE HYDROCHLORIDE 5 MG: 10 TABLET, FILM COATED ORAL at 09:03

## 2025-05-12 RX ADMIN — Medication 10 ML: at 09:03

## 2025-05-12 RX ADMIN — SERTRALINE HYDROCHLORIDE 50 MG: 25 TABLET ORAL at 09:03

## 2025-05-12 RX ADMIN — EMPAGLIFLOZIN 10 MG: 10 TABLET, FILM COATED ORAL at 09:03

## 2025-05-12 RX ADMIN — APIXABAN 5 MG: 5 TABLET, FILM COATED ORAL at 09:03

## 2025-05-12 RX ADMIN — ATORVASTATIN CALCIUM 40 MG: 40 TABLET, FILM COATED ORAL at 09:03

## 2025-05-12 RX ADMIN — SODIUM CHLORIDE 40 MG: 9 INJECTION INTRAMUSCULAR; INTRAVENOUS; SUBCUTANEOUS at 09:02

## 2025-05-12 ASSESSMENT — PAIN SCALES - GENERAL
PAINLEVEL_OUTOF10: 0

## 2025-05-12 NOTE — PLAN OF CARE
Problem: Discharge Planning  Goal: Discharge to home or other facility with appropriate resources  5/12/2025 0838 by Lin Triplett RN  Outcome: Progressing  5/11/2025 2316 by Jelani Cr RN  Outcome: Progressing  Flowsheets (Taken 5/11/2025 2055)  Discharge to home or other facility with appropriate resources:   Identify barriers to discharge with patient and caregiver   Arrange for needed discharge resources and transportation as appropriate   Identify discharge learning needs (meds, wound care, etc)     Problem: Pain  Goal: Verbalizes/displays adequate comfort level or baseline comfort level  5/12/2025 0838 by Lin Triplett RN  Outcome: Progressing  5/11/2025 2316 by Jelani Cr RN  Outcome: Progressing  Flowsheets (Taken 5/11/2025 2055)  Verbalizes/displays adequate comfort level or baseline comfort level:   Encourage patient to monitor pain and request assistance   Assess pain using appropriate pain scale   Administer analgesics based on type and severity of pain and evaluate response   Implement non-pharmacological measures as appropriate and evaluate response   Consider cultural and social influences on pain and pain management     Problem: Safety - Adult  Goal: Free from fall injury  5/12/2025 0838 by Lin Triplett RN  Outcome: Progressing  Flowsheets (Taken 5/12/2025 0837)  Free From Fall Injury: Instruct family/caregiver on patient safety  5/11/2025 2316 by Jelani Cr RN  Outcome: Progressing  Flowsheets (Taken 5/11/2025 2055)  Free From Fall Injury: Instruct family/caregiver on patient safety     Problem: ABCDS Injury Assessment  Goal: Absence of physical injury  5/12/2025 0838 by Lin Triplett RN  Outcome: Progressing  Flowsheets (Taken 5/12/2025 0837)  Absence of Physical Injury: Implement safety measures based on patient assessment  5/11/2025 2316 by Jelani Cr RN  Outcome: Progressing  Flowsheets (Taken 5/11/2025 2055)  Absence of Physical Injury: Implement safety measures based on

## 2025-05-12 NOTE — PLAN OF CARE
KM nocturnist note    Notified patient's heart rate dipping into the 50s.  Patient was admitted with A-fib RVR today.  He received amiodarone bolus and started on an infusion. patient appears to be in sinus rhythm.  Blood pressure hangs out around systolic blood pressure 100.  Will discontinue amiodarone infusion given the bradycardia and relative hypotension and the fact that he is back in sinus rhythm   None

## 2025-05-12 NOTE — DISCHARGE SUMMARY
Discharge Summary    Name:  Chance Zambrano /Age/Sex: 1945 (80 y.o. male)   Admit Date: 5/10/2025  Discharge Date: 25    MRN & CSN:  [id number] & [id number] Encounter Date and Time 25 12:54 PM EDT    Attending:  No att. providers found Discharging Provider: Fran Ballesteros MD         Discharge Diagnosis:   # A-fib with RVR.  # Hypotension requiring vasopressors, likely secondary to medications.  # Fatigue.   # Mildly elevated troponin, likely supply/demand mismatch in the setting of tachycardia.  # Long-term anticoagulation and Eliquis.  # Hyperlipidemia on Lipitor.  # Depression on Zoloft.  # BPH on Flomax  # CHF, HFpEF, chronic, stable.    Hospital Course:   Chance Zambrano is a 80 y.o. male  who presents with known history of A-fib status post ablation presented with tachycardia.  His heart rate was in the 140s at home.  In the ED, patient was in A-fib with RVR and received Cardizem bolus 15 mg and subsequently heart rate became controlled.  He was admitted for further evaluation and management of A-fib.   Patient received his home meds in addition to Lopressor 25 mg.  He subsequently became hypotensive which required sending him to the ICU to initiate vasopressors.  He was quickly weaned off Levophed and antihypertensive were continued to be held.  He was seen by cardiology consultation.  Patient received amiodarone load and amiodarone drip was initiated.  He subsequently reverted to sinus rhythm.  Patient was discharged home on his previous medications.  Recommendations were made to follow-up with cardiology as outpatient.    Consults this admission:  IP CONSULT TO CRITICAL CARE  IP CONSULT TO CARDIOLOGY    Discharge Instruction:   Follow up appointments: Follow-up with cardiology.  Primary care physician: Krishna Flroes MD within 1 week  Diet: cardiac diet   Activity: activity as tolerated  Disposition: Discharged to:   [x]Home, []C, []SNF, []Acute Rehab, []Hospice   Condition on discharge:

## 2025-05-12 NOTE — PLAN OF CARE
Problem: Discharge Planning  Goal: Discharge to home or other facility with appropriate resources  5/12/2025 1120 by Lin Triplett RN  Outcome: Completed  Flowsheets (Taken 5/12/2025 0900)  Discharge to home or other facility with appropriate resources:   Identify barriers to discharge with patient and caregiver   Arrange for needed discharge resources and transportation as appropriate   Identify discharge learning needs (meds, wound care, etc)   Refer to discharge planning if patient needs post-hospital services based on physician order or complex needs related to functional status, cognitive ability or social support system  5/12/2025 0838 by Lin Triplett RN  Outcome: Progressing  5/11/2025 2316 by Jelani Cr RN  Outcome: Progressing  Flowsheets (Taken 5/11/2025 2055)  Discharge to home or other facility with appropriate resources:   Identify barriers to discharge with patient and caregiver   Arrange for needed discharge resources and transportation as appropriate   Identify discharge learning needs (meds, wound care, etc)     Problem: Pain  Goal: Verbalizes/displays adequate comfort level or baseline comfort level  5/12/2025 1120 by Lin Triplett RN  Outcome: Completed  5/12/2025 0838 by Lin Triplett RN  Outcome: Progressing  5/11/2025 2316 by Jelani Cr RN  Outcome: Progressing  Flowsheets (Taken 5/11/2025 2055)  Verbalizes/displays adequate comfort level or baseline comfort level:   Encourage patient to monitor pain and request assistance   Assess pain using appropriate pain scale   Administer analgesics based on type and severity of pain and evaluate response   Implement non-pharmacological measures as appropriate and evaluate response   Consider cultural and social influences on pain and pain management     Problem: Safety - Adult  Goal: Free from fall injury  5/12/2025 1120 by Lin Triplett RN  Outcome: Completed  5/12/2025 0838 by Lin Triplett RN  Outcome: Progressing  Flowsheets (Taken 5/12/2025

## 2025-05-12 NOTE — PLAN OF CARE
Problem: Discharge Planning  Goal: Discharge to home or other facility with appropriate resources  5/11/2025 2316 by Jelani Cr RN  Outcome: Progressing  Flowsheets (Taken 5/11/2025 2055)  Discharge to home or other facility with appropriate resources:   Identify barriers to discharge with patient and caregiver   Arrange for needed discharge resources and transportation as appropriate   Identify discharge learning needs (meds, wound care, etc)  5/11/2025 1654 by Yessy Beckman RN  Outcome: Progressing     Problem: Pain  Goal: Verbalizes/displays adequate comfort level or baseline comfort level  5/11/2025 2316 by Jelani Cr RN  Outcome: Progressing  Flowsheets (Taken 5/11/2025 2055)  Verbalizes/displays adequate comfort level or baseline comfort level:   Encourage patient to monitor pain and request assistance   Assess pain using appropriate pain scale   Administer analgesics based on type and severity of pain and evaluate response   Implement non-pharmacological measures as appropriate and evaluate response   Consider cultural and social influences on pain and pain management  5/11/2025 1654 by Yessy Beckman RN  Outcome: Progressing     Problem: Safety - Adult  Goal: Free from fall injury  5/11/2025 2316 by Jelani Cr RN  Outcome: Progressing  Flowsheets (Taken 5/11/2025 2055)  Free From Fall Injury: Instruct family/caregiver on patient safety  5/11/2025 1654 by Yessy Beckman RN  Outcome: Progressing  Flowsheets (Taken 5/11/2025 0615 by Gogo Jc RN)  Free From Fall Injury: Instruct family/caregiver on patient safety     Problem: ABCDS Injury Assessment  Goal: Absence of physical injury  5/11/2025 2316 by Jelani Cr RN  Outcome: Progressing  Flowsheets (Taken 5/11/2025 2055)  Absence of Physical Injury: Implement safety measures based on patient assessment  5/11/2025 1654 by Yessy Beckman RN  Outcome: Progressing     Problem: Chronic Conditions and Co-morbidities  Goal:

## 2025-05-12 NOTE — PROGRESS NOTES
CURT:  END OF SHIFT SUMMARY    Significant change(s) in patient condition: Pt alert and oriented, rested well throughout the night. Remained in SR to SB with frequent PACs and PVCs. Pt with multiple episodes of apnea while sleeping. Pt states he was diagnosed with sleep apnea but does not have a cpap. Amiodarone Dc'd due to bradycardia. Refused bath, is hoping to discharge today.           PAIN control/PRN MEDS GIVEN: none          FAMILY/SOCIAL Issues or concerns: pt wants to discharge, states he is moving homes on Tuesday.            BARRIERS TOWARD GOALS/DISCHARGE: echo, dopplers, cardiology plan    
        [facility] Progress Note      Name:  Chance Zambrano /Age/Sex: 1945  (80 y.o. male)   MRN & CSN:  [id number] & [id number] Encounter Date/Time: 2025 8:58 AM EDT   Location:  2610/2610-01 PCP: Krishna Flores MD     Attending:Fran Ballesteros MD       Hospital Day: 2          Impression   Chance Zambrano is a 80 y.o. male  who presents with known history of A-fib status post ablation presented with tachycardia.  His heart rate was in the 140s at home.  In the ED, patient was in A-fib with RVR and received Cardizem bolus 15 mg and subsequently heart rate became controlled.  He was admitted for further evaluation and management of A-fib.     Assessment and Plan   Assessment:  # A-fib with RVR.  # Hypotension requiring vasopressors, likely secondary to medications, patient received Entresto, Lopressor, Aldactone and Flomax last evening.  Off pressors this morning.  # Fatigue.   # Mildly elevated troponin, likely supply/demand mismatch in the setting of tachycardia.  # Long-term anticoagulation and Eliquis.  # Hyperlipidemia on Lipitor.  # Depression on Zoloft.  # BPH on Flomax  # CHF, HFpEF, chronic, stable, echo 2023 showed EF of 60 to 65%.    Plan:  # Discussed with cardiology and critical care.  Starting amiodarone.  # Per cardiology, tentative plans for cardioversion in a.m. if patient does not revert to sinus rhythm.  # N.p.o. postmidnight for possible cardioversion in a.m.  # Continue to hold Lopressor, Entresto, Flomax and Aldactone.    DVT Prophylaxis: Anticoagulated on Eliquis.  Code Status: Full Code        Disposition   Expected Disposition: Home  Estimated Date of Discharge: 2025  Patient requires continued admission due to pending rate/rhythm control    Subjective:   Overnight, patient became hypotensive and sent to the ICU for pressors.  He is off pressors this morning.  He feels fine and asking about going home.  He denies any chest pain or dizziness.    Objective:     Intake/Output 
4 Eyes Skin Assessment     NAME:  Chance Zambrano  YOB: 1945  MEDICAL RECORD NUMBER:  [id number]    The patient is being assessed for  Admission    I agree that at least one RN has performed a thorough Head to Toe Skin Assessment on the patient. ALL assessment sites listed below have been assessed.      Areas assessed by both nurses:    Head, Face, Ears, Shoulders, Back, Chest, Arms, Elbows, Hands, Sacrum. Buttock, Coccyx, Ischium, Legs. Feet and Heels, and Under Medical Devices         Does the Patient have a Wound? No noted wound(s)       Jc Prevention initiated by RN: No  Wound Care Orders initiated by RN: No    Pressure Injury (Stage 3,4, Unstageable, DTI, NWPT, and Complex wounds) if present, place Wound referral order by RN under : No    New Ostomies, if present place, Ostomy referral order under : No     Nurse 1 eSignature: Electronically signed by LAURA HENRIQUEZ RN on 5/10/25 at 5:36 PM EDT    **SHARE this note so that the co-signing nurse can place an eSignature**    Nurse 2 eSignature: Electronically signed by Gita Peralta RN on 5/11/25 at 2:26 AM EDT    
4 Eyes Skin Assessment     NAME:  Chance Zambrano  YOB: 1945  MEDICAL RECORD NUMBER:  [id number]    The patient is being assessed for  Transfer to New Unit    I agree that at least one RN has performed a thorough Head to Toe Skin Assessment on the patient. ALL assessment sites listed below have been assessed.      Areas assessed by both nurses:    Head, Face, Ears, Shoulders, Back, Chest, Arms, Elbows, Hands, Sacrum. Buttock, Coccyx, Ischium, Legs. Feet and Heels, and Under Medical Devices         Does the Patient have a Wound? No noted wound(s)       Jc Prevention initiated by RN: No  Wound Care Orders initiated by RN: No    Pressure Injury (Stage 3,4, Unstageable, DTI, NWPT, and Complex wounds) if present, place Wound referral order by RN under : No    New Ostomies, if present place, Ostomy referral order under : No     Nurse 1 eSignature: Electronically signed by Gogo Jc RN on 5/11/25 at 6:22 AM EDT    **SHARE this note so that the co-signing nurse can place an eSignature**    Nurse 2 eSignature: {Esignature:[id number]}    
At this time there are no critical care needs. VSS.  Cardiology is following.  Critical Care will sign off.  Please call with any questions or concerns.        LORENA Vázquez- CNP  
Attempted to call patient's wife to alert her of room change. Voicemail left.   
BP remains low, unchanged from completion of Albumin.   250 mL bolus initiated.   
Blood pressure remains low, 80's/50's. Dr. Balderas notified. Orders for Levophed and transfer to ICU. Drip initiated at 2 mcg/min. Patient transported to room 2610 with all belongings.   
Heart rate has slowed down since the beginning of shift. Rhythm varies between SR with PAC, PVC and A-fib. Rate 70-90's.   Patient denies complaints. Vitals stable.  Wife at bedside.   CHG wipes provided, assistance given. Non-skid socks applied.  Bed in lowest position, side rails up X 2, bed alarm on. Call light in reach, encouraged to call for any needs.   
Levophed stopped at this time.  
Patient HR 83 /89 - did not start Cardizem.  Current vitals do not meet parameters for Cardizem.     Electronically signed by Yessy Beckman RN on 5/11/2025 at 7:17 PM    
Patient transferred to ICU for hypotension and Levophed. Patient transferred to ICU bed and hooked to monitoring equipment. He's sitting up in bed watching TV. Patient  denies any complaints at this time. Patient oriented to room and POC. 4 eyes skin assessment complete.  
Patient's BP 80's/50's. MAP 63-69. Dr. Balderas notified. Albumin infusion given as ordered.   
has never smoked. He does not have any smokeless tobacco history on file. He reports current alcohol use of about 1.0 standard drink of alcohol per week. He reports that he does not use drugs.     Family History:  family history includes Cancer in his mother.      Home Medications:  Were reviewed and are listed in nursing record and/or below  Prior to Admission medications    Medication Sig Start Date End Date Taking? Authorizing Provider   apixaban (ELIQUIS) 5 MG TABS tablet Take 1 tablet by mouth 2 times daily   Yes Brandan Peoples MD   empagliflozin (JARDIANCE) 10 MG tablet Take 1 tablet by mouth daily   Yes Brandan Peoples MD   tamsulosin (FLOMAX) 0.4 MG capsule Take 1 capsule by mouth daily   Yes Brandan Peoples MD   sertraline (ZOLOFT) 50 MG tablet Take 1 tablet by mouth daily   Yes Brandan Peoples MD   spironolactone (ALDACTONE) 25 MG tablet Take 1 tablet by mouth daily   Yes Brandan Peoples MD   sacubitril-valsartan (ENTRESTO) 24-26 MG per tablet Take 0.5 tablets by mouth 2 times daily   Yes Brandan Peoples MD   atorvastatin (LIPITOR) 40 MG tablet Take 1 tablet by mouth daily    Brandan Peoples MD   loratadine (CLARITIN) 10 MG capsule Take 10 mg by mouth daily    ProviderBrandan MD        CURRENT Medications:  norepinephrine (LEVOPHED) 16 mg in sodium chloride 0.9 % 250 mL infusion (premix), Continuous  [Held by provider] sacubitril-valsartan (ENTRESTO) 24-26 MG per tablet 0.5 tablet, BID  [START ON 5/13/2025] pantoprazole (PROTONIX) tablet 40 mg, QAM AC  apixaban (ELIQUIS) tablet 5 mg, BID  atorvastatin (LIPITOR) tablet 40 mg, Daily  empagliflozin (JARDIANCE) tablet 10 mg, Daily  cetirizine (ZYRTEC) tablet 5 mg, Daily  sertraline (ZOLOFT) tablet 50 mg, Daily  [Held by provider] spironolactone (ALDACTONE) tablet 25 mg, Daily  sodium chloride flush 0.9 % injection 5-40 mL, 2 times per day  sodium chloride flush 0.9 % injection 5-40 mL, PRN  0.9 % sodium chloride

## 2025-05-12 NOTE — CARE COORDINATION
Discharge Planning Note:    Chart reviewed and it appears that patient has minimal needs for discharge at this time. Risk Score 5 %     Primary Care Physician is LOCO SCHAFFER   Primary insurance is [employer]     Please notify case management if any discharge needs are identified.      Case management will continue to follow progress and update discharge plan as needed.     Pt from home with spouse. Pt IPTA. Plan TBD

## 2025-05-13 LAB
EKG ATRIAL RATE: 61 BPM
EKG ATRIAL RATE: 84 BPM
EKG DIAGNOSIS: NORMAL
EKG P AXIS: 19 DEGREES
EKG P AXIS: 55 DEGREES
EKG P-R INTERVAL: 164 MS
EKG P-R INTERVAL: 164 MS
EKG Q-T INTERVAL: 326 MS
EKG Q-T INTERVAL: 358 MS
EKG Q-T INTERVAL: 360 MS
EKG Q-T INTERVAL: 466 MS
EKG QRS DURATION: 100 MS
EKG QRS DURATION: 106 MS
EKG QRS DURATION: 106 MS
EKG QRS DURATION: 98 MS
EKG QTC CALCULATION (BAZETT): 423 MS
EKG QTC CALCULATION (BAZETT): 473 MS
EKG QTC CALCULATION (BAZETT): 497 MS
EKG QTC CALCULATION (BAZETT): 510 MS
EKG R AXIS: -12 DEGREES
EKG R AXIS: -34 DEGREES
EKG R AXIS: -37 DEGREES
EKG R AXIS: -39 DEGREES
EKG T AXIS: 121 DEGREES
EKG T AXIS: 163 DEGREES
EKG T AXIS: 70 DEGREES
EKG T AXIS: 92 DEGREES
EKG VENTRICULAR RATE: 104 BPM
EKG VENTRICULAR RATE: 140 BPM
EKG VENTRICULAR RATE: 72 BPM
EKG VENTRICULAR RATE: 84 BPM

## 2025-05-13 PROCEDURE — 93010 ELECTROCARDIOGRAM REPORT: CPT | Performed by: INTERNAL MEDICINE
